# Patient Record
Sex: FEMALE | Race: WHITE | NOT HISPANIC OR LATINO | URBAN - METROPOLITAN AREA
[De-identification: names, ages, dates, MRNs, and addresses within clinical notes are randomized per-mention and may not be internally consistent; named-entity substitution may affect disease eponyms.]

---

## 2021-12-21 ENCOUNTER — EMERGENCY (EMERGENCY)
Facility: HOSPITAL | Age: 51
LOS: 1 days | Discharge: HOME | End: 2021-12-21
Attending: EMERGENCY MEDICINE
Payer: COMMERCIAL

## 2021-12-21 PROCEDURE — 99053 MED SERV 10PM-8AM 24 HR FAC: CPT

## 2021-12-21 PROCEDURE — 99285 EMERGENCY DEPT VISIT HI MDM: CPT
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
## 2021-12-21 NOTE — ED PROVIDER NOTE - ATTENDING CONTRIBUTION TO CARE
I personally saw the patient with the resident, and completed the key components of the history and physical exam. I then discussed the management plan with the resident.   gen mild pain resp clear cardiac no nmurmur abd soft msk + ttp b/l ankles neurovasc intact

## 2021-12-21 NOTE — ED PROVIDER NOTE - NSFOLLOWUPINSTRUCTIONS_ED_ALL_ED_FT
Followup with orthopedic doctor in next 1-7 days.     Fracture    A fracture is a break in one of your bones. This can occur from a variety of injuries, especially traumatic ones. Symptoms include pain, bruising, or swelling. Do not use the injured limb. If a fracture is in one of the bones below your waist, do not put weight on that limb unless instructed to do so by your healthcare provider. Crutches or a cane may have been provided. A splint or cast may have been applied by your health care provider. Make sure to keep it dry and follow up with an orthopedist as instructed.    SEEK IMMEDIATE MEDICAL CARE IF YOU HAVE ANY OF THE FOLLOWING SYMPTOMS: numbness, tingling, increasing pain, or weakness in any part of the injured limb.

## 2021-12-21 NOTE — ED PROVIDER NOTE - CARE PROVIDER_API CALL
Theodore Briseno)  Orthopaedic Surgery  [address] [place] [address]  Phone: [phone]  Fax: [phone]  Follow Up Time: 4-6 Days

## 2021-12-21 NOTE — ED PROVIDER NOTE - PHYSICAL EXAMINATION
General: Well appearing female  HEENT: Normocephalic, atraumatic. Moist mucous membranes. Oropharynx clear. No lymphadenopathy.  Eyes: No scleral icterus. EOMI. JAIR.  Neck:. Soft and supple. Full ROM without pain. No midline tenderness  Cardiac: Regular rate and regular rhythm. No murmurs, rubs, gallops. Peripheral pulses 2+ and symmetric. No LE edema.  Resp: Lungs CTAB. Speaking in full sentences. No wheezes, rales or rhonchi.  Abd: Soft, non-tender, non-distended. No guarding or rebound. No scars, masses, or lesions.  Back: Spine midline and non-tender. No CVA tenderness.    Skin:  abrasion over dorsal surface of left foot  Neuro: AO x 3. Moves all extremities symmetrically. Motor strength and sensation grossly intact.  Msk: Tenderness over dorsal portion of right foot, DP 2+ bilaterally, tenderness over dorsal portion of left foot
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
## 2021-12-21 NOTE — ED PROVIDER NOTE - NS ED ROS FT
General: Denies fever, chills  HEENT: Denies sensory changes, sore throat  Neck: Denies neck pain, neck stiffness  Resp: Denies coughing, SOB  Cardiovascular: Denies CP, palpitations, LE edema  GI: Denies nausea, vomiting, abdominal pain, diarrhea, constipation, blood in stool  : Denies dysuria, hematuria, frequency, incontinence  MSK: + bilateral ankle pain   Neuro: Denies HA, dizziness, numbness, weakness  Skin: Denies rashes.
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
## 2021-12-21 NOTE — ED PROVIDER NOTE - PROGRESS NOTE DETAILS
Yu Quinones for ED attending, Dr. Chaves: spoke to ortho PA who will come see patient. Beaulieu: patient with b/l lower extremity splints placed by orthopedic team. crutches given w/ hard sole shoe for R foot. strict return precautions discussed.
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
## 2021-12-22 VITALS
HEART RATE: 89 BPM | OXYGEN SATURATION: 98 % | RESPIRATION RATE: 18 BRPM | DIASTOLIC BLOOD PRESSURE: 74 MMHG | SYSTOLIC BLOOD PRESSURE: 127 MMHG

## 2021-12-22 VITALS
SYSTOLIC BLOOD PRESSURE: 175 MMHG | HEART RATE: 103 BPM | RESPIRATION RATE: 20 BRPM | OXYGEN SATURATION: 97 % | DIASTOLIC BLOOD PRESSURE: 135 MMHG | TEMPERATURE: 98 F

## 2021-12-22 LAB
ANION GAP SERPL CALC-SCNC: 17 MMOL/L — SIGNIFICANT CHANGE UP (ref 5–17)
APTT BLD: 33 SEC — SIGNIFICANT CHANGE UP (ref 27.5–35.5)
BASOPHILS # BLD AUTO: 0.03 K/UL — SIGNIFICANT CHANGE UP (ref 0–0.2)
BASOPHILS NFR BLD AUTO: 0.3 % — SIGNIFICANT CHANGE UP (ref 0–2)
BLD GP AB SCN SERPL QL: SIGNIFICANT CHANGE UP
BUN SERPL-MCNC: 15.8 MG/DL — SIGNIFICANT CHANGE UP (ref 8–20)
CALCIUM SERPL-MCNC: 9.8 MG/DL — SIGNIFICANT CHANGE UP (ref 8.6–10.2)
CHLORIDE SERPL-SCNC: 103 MMOL/L — SIGNIFICANT CHANGE UP (ref 98–107)
CO2 SERPL-SCNC: 22 MMOL/L — SIGNIFICANT CHANGE UP (ref 22–29)
CREAT SERPL-MCNC: 0.77 MG/DL — SIGNIFICANT CHANGE UP (ref 0.5–1.3)
EOSINOPHIL # BLD AUTO: 0.11 K/UL — SIGNIFICANT CHANGE UP (ref 0–0.5)
EOSINOPHIL NFR BLD AUTO: 1.2 % — SIGNIFICANT CHANGE UP (ref 0–6)
GLUCOSE SERPL-MCNC: 126 MG/DL — HIGH (ref 70–99)
HCG SERPL-ACNC: <4 MIU/ML — SIGNIFICANT CHANGE UP
HCT VFR BLD CALC: 44.4 % — SIGNIFICANT CHANGE UP (ref 34.5–45)
HGB BLD-MCNC: 14.3 G/DL — SIGNIFICANT CHANGE UP (ref 11.5–15.5)
IMM GRANULOCYTES NFR BLD AUTO: 0.2 % — SIGNIFICANT CHANGE UP (ref 0–1.5)
INR BLD: 1.04 RATIO — SIGNIFICANT CHANGE UP (ref 0.88–1.16)
LYMPHOCYTES # BLD AUTO: 1.8 K/UL — SIGNIFICANT CHANGE UP (ref 1–3.3)
LYMPHOCYTES # BLD AUTO: 19.2 % — SIGNIFICANT CHANGE UP (ref 13–44)
MCHC RBC-ENTMCNC: 27.2 PG — SIGNIFICANT CHANGE UP (ref 27–34)
MCHC RBC-ENTMCNC: 32.2 GM/DL — SIGNIFICANT CHANGE UP (ref 32–36)
MCV RBC AUTO: 84.6 FL — SIGNIFICANT CHANGE UP (ref 80–100)
MONOCYTES # BLD AUTO: 0.74 K/UL — SIGNIFICANT CHANGE UP (ref 0–0.9)
MONOCYTES NFR BLD AUTO: 7.9 % — SIGNIFICANT CHANGE UP (ref 2–14)
NEUTROPHILS # BLD AUTO: 6.69 K/UL — SIGNIFICANT CHANGE UP (ref 1.8–7.4)
NEUTROPHILS NFR BLD AUTO: 71.2 % — SIGNIFICANT CHANGE UP (ref 43–77)
PLATELET # BLD AUTO: 285 K/UL — SIGNIFICANT CHANGE UP (ref 150–400)
POTASSIUM SERPL-MCNC: 4 MMOL/L — SIGNIFICANT CHANGE UP (ref 3.5–5.3)
POTASSIUM SERPL-SCNC: 4 MMOL/L — SIGNIFICANT CHANGE UP (ref 3.5–5.3)
PROTHROM AB SERPL-ACNC: 12 SEC — SIGNIFICANT CHANGE UP (ref 10.6–13.6)
RAPID RVP RESULT: DETECTED
RBC # BLD: 5.25 M/UL — HIGH (ref 3.8–5.2)
RBC # FLD: 15.2 % — HIGH (ref 10.3–14.5)
RV+EV RNA SPEC QL NAA+PROBE: DETECTED
SARS-COV-2 RNA SPEC QL NAA+PROBE: SIGNIFICANT CHANGE UP
SODIUM SERPL-SCNC: 142 MMOL/L — SIGNIFICANT CHANGE UP (ref 135–145)
WBC # BLD: 9.39 K/UL — SIGNIFICANT CHANGE UP (ref 3.8–10.5)
WBC # FLD AUTO: 9.39 K/UL — SIGNIFICANT CHANGE UP (ref 3.8–10.5)

## 2021-12-22 PROCEDURE — 85025 COMPLETE CBC W/AUTO DIFF WBC: CPT

## 2021-12-22 PROCEDURE — 86901 BLOOD TYPING SEROLOGIC RH(D): CPT

## 2021-12-22 PROCEDURE — 73590 X-RAY EXAM OF LOWER LEG: CPT | Mod: 26,50

## 2021-12-22 PROCEDURE — 85730 THROMBOPLASTIN TIME PARTIAL: CPT

## 2021-12-22 PROCEDURE — 99284 EMERGENCY DEPT VISIT MOD MDM: CPT | Mod: 25

## 2021-12-22 PROCEDURE — 73700 CT LOWER EXTREMITY W/O DYE: CPT | Mod: 26,50,MG

## 2021-12-22 PROCEDURE — G1004: CPT

## 2021-12-22 PROCEDURE — 28490 TREAT BIG TOE FRACTURE: CPT | Mod: T5

## 2021-12-22 PROCEDURE — 73590 X-RAY EXAM OF LOWER LEG: CPT

## 2021-12-22 PROCEDURE — 73630 X-RAY EXAM OF FOOT: CPT

## 2021-12-22 PROCEDURE — 80048 BASIC METABOLIC PNL TOTAL CA: CPT

## 2021-12-22 PROCEDURE — 73700 CT LOWER EXTREMITY W/O DYE: CPT | Mod: MG

## 2021-12-22 PROCEDURE — 84702 CHORIONIC GONADOTROPIN TEST: CPT

## 2021-12-22 PROCEDURE — 73630 X-RAY EXAM OF FOOT: CPT | Mod: 26,50

## 2021-12-22 PROCEDURE — 86900 BLOOD TYPING SEROLOGIC ABO: CPT

## 2021-12-22 PROCEDURE — 99283 EMERGENCY DEPT VISIT LOW MDM: CPT | Mod: 57

## 2021-12-22 PROCEDURE — 28450 TX TARSAL B1 FX W/O MNPJ EA: CPT | Mod: LT

## 2021-12-22 PROCEDURE — 36415 COLL VENOUS BLD VENIPUNCTURE: CPT

## 2021-12-22 PROCEDURE — 73610 X-RAY EXAM OF ANKLE: CPT

## 2021-12-22 PROCEDURE — 86850 RBC ANTIBODY SCREEN: CPT

## 2021-12-22 PROCEDURE — 73610 X-RAY EXAM OF ANKLE: CPT | Mod: 26,50

## 2021-12-22 PROCEDURE — 0225U NFCT DS DNA&RNA 21 SARSCOV2: CPT

## 2021-12-22 PROCEDURE — 85610 PROTHROMBIN TIME: CPT

## 2021-12-22 RX ORDER — HYDROMORPHONE HYDROCHLORIDE 2 MG/ML
1 INJECTION INTRAMUSCULAR; INTRAVENOUS; SUBCUTANEOUS ONCE
Refills: 0 | Status: DISCONTINUED | OUTPATIENT
Start: 2021-12-22 | End: 2021-12-22

## 2021-12-22 RX ADMIN — HYDROMORPHONE HYDROCHLORIDE 1 MILLIGRAM(S): 2 INJECTION INTRAMUSCULAR; INTRAVENOUS; SUBCUTANEOUS at 05:19

## 2021-12-22 RX ADMIN — HYDROMORPHONE HYDROCHLORIDE 1 MILLIGRAM(S): 2 INJECTION INTRAMUSCULAR; INTRAVENOUS; SUBCUTANEOUS at 01:20

## 2021-12-22 RX ADMIN — HYDROMORPHONE HYDROCHLORIDE 1 MILLIGRAM(S): 2 INJECTION INTRAMUSCULAR; INTRAVENOUS; SUBCUTANEOUS at 03:45

## 2021-12-22 RX ADMIN — HYDROMORPHONE HYDROCHLORIDE 1 MILLIGRAM(S): 2 INJECTION INTRAMUSCULAR; INTRAVENOUS; SUBCUTANEOUS at 00:05

## 2021-12-22 RX ADMIN — HYDROMORPHONE HYDROCHLORIDE 1 MILLIGRAM(S): 2 INJECTION INTRAMUSCULAR; INTRAVENOUS; SUBCUTANEOUS at 00:16

## 2021-12-22 NOTE — CONSULT NOTE ADULT - SUBJECTIVE AND OBJECTIVE BOX
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 were obtained   without intravenous contrast. Coronal and sagittal reformatted images   were also obtained. 3-D images were obtained from a separate workstation.    CONTRAST/COMPLICATIONS:  IV Contrast: NONE  Complications: None reported at time of study completion    COMPARISON: XR: 12/22/2021. CT: None. MR: None.    FINDINGS: Evaluation of soft tissue is limited without intravenous   contrast.    RIGHT  Bones: Nondisplaced, intra-articular fracture of the lateralbase of the   first distal phalanx. No dislocation.    Mild deformity of the posterior talus with adjacent corticated ossific   densities, which may be related to old post traumatic changes and/or os   trigonum. Degenerative changes at the right ankle and foot. Nonspecific   scattered small sclerotic foci.    Soft tissue: Nonspecific stranding/edema in the right great toe. Diffuse   muscle bulk loss with fatty replacement. Trace ankle joint effusion.   Achilles enthesopathy.    LEFT  Bones: Minimally displaced fracture of the lateral aspect of the medial   cuneiform.    Slight contour deformity of the left medial malleolus with adjacent   ossific densities, which may represent age-indeterminate avulsion   fracture.    Mild deformity of the posterior talus with adjacent corticated ossific   densities, which may be related to old post traumatic changes and/or os   trigonum. Degenerative changes at the right ankle and foot. Nonspecific   scattered small sclerotic foci.    Soft tissue: Edema/hematoma and foci of air in the ankle/foot soft   tissue. Diffuse muscle bulk loss with fatty replacement. Trace ankle   joint effusion. Achilles enthesopathy.    IMPRESSION:    RIGHT: Nondisplaced intra-articular fracture of the lateral base of the   right first distal phalanx.    LEFT: Minimally displaced fracture of the lateral aspect of the left   medial cuneiform. Suggest age-indeterminate (avulsion) fracture off of   the left medial malleolus.    --- End of Report ---            RACHEL SENA; Attending Radiologist  This document has been electronically signed. Dec 22 2021  3:25AM    < end of copied text >     Procedure: SPLINTING   PROCEDURE NOTE: Splinting    Performed by: Enzo Torres PA-C     Indication: right 1st toe fx, left cuneiform fx    The RLE/LLE was appropriately positioned. A plaster splint was applied. Distally, the extremity was neurovascular intact following the procedure. The patient tolerated the procedure well.     A/P:  Pt is a  51y Female with right nondisplaced distal phalanx fx & left medial cuneiform fx. Age indeterminate fracture of the left medial malleolus.    PLAN:   * NWB of the LLE in splint  * Patient may weight bear on the right foot in fx shoe due to injury of the left foot  * Elevate b/l LEs as tolerated  * Recommend follow up outpt with podiatry or Ortho Trauma  * Pt eval as needed if pt unable to ambulate  * Ortho stable

## 2021-12-22 NOTE — CONSULT NOTE ADULT - ATTENDING COMMENTS
Orthopaedic Trauma Surgeon Addendum:    I have personally performed a face-to-face diagnostic evaluation on this patient.  I have reviewed the physician assistant note and agree with the history, exam, and plan of care, except as noted.    Theodore Briseno MD  Orthopaedic Trauma Surgeon  [facility] [place]

## 2022-02-04 NOTE — ED PROVIDER NOTE - PATIENT PORTAL LINK FT
Chief Complaints and History of Present Illnesses   Patient presents with     Glaucoma Follow-Up       Chief Complaint(s) and History of Present Illness(es)     Glaucoma Follow-Up     Laterality: both eyes              Comments     Patient here for IOP check and dilation. Last visit was 9/20/21 with Dr. Gupta. Pt notes eyes feel like they have glue in them. Continues to be light sensitive, states this is the same as it was at last visit.   Using Latanoprost at bedtime each eye (last dose: 9:00pm 2/3/22). Also using Systane for dryness prn each eye.     Here with daughter who is her POA.                Betsey Chavez, COA    
You can access the FollowMyHealth Patient Portal offered by [facility] by registering at the following website: [email or web address]. By joining [employer]’s FollowMyHealth portal, you will also be able to view your health information using other applications (apps) compatible with our system.

## 2023-03-15 ENCOUNTER — OFFICE (OUTPATIENT)
Dept: URBAN - METROPOLITAN AREA CLINIC 113 | Facility: CLINIC | Age: 53
Setting detail: OPHTHALMOLOGY
End: 2023-03-15
Payer: MEDICAID

## 2023-03-15 DIAGNOSIS — H01.001: ICD-10-CM

## 2023-03-15 DIAGNOSIS — H40.013: ICD-10-CM

## 2023-03-15 DIAGNOSIS — H52.7: ICD-10-CM

## 2023-03-15 DIAGNOSIS — H01.004: ICD-10-CM

## 2023-03-15 PROCEDURE — 76514 ECHO EXAM OF EYE THICKNESS: CPT | Performed by: STUDENT IN AN ORGANIZED HEALTH CARE EDUCATION/TRAINING PROGRAM

## 2023-03-15 PROCEDURE — 92020 GONIOSCOPY: CPT | Performed by: STUDENT IN AN ORGANIZED HEALTH CARE EDUCATION/TRAINING PROGRAM

## 2023-03-15 PROCEDURE — 92004 COMPRE OPH EXAM NEW PT 1/>: CPT | Performed by: STUDENT IN AN ORGANIZED HEALTH CARE EDUCATION/TRAINING PROGRAM

## 2023-03-15 PROCEDURE — 92015 DETERMINE REFRACTIVE STATE: CPT | Performed by: STUDENT IN AN ORGANIZED HEALTH CARE EDUCATION/TRAINING PROGRAM

## 2023-03-15 PROCEDURE — 92250 FUNDUS PHOTOGRAPHY W/I&R: CPT | Performed by: STUDENT IN AN ORGANIZED HEALTH CARE EDUCATION/TRAINING PROGRAM

## 2023-03-15 ASSESSMENT — REFRACTION_MANIFEST
OD_CYLINDER: -1.25
OS_CYLINDER: -0.75
OS_AXIS: 087
OS_ADD: +1.50
OS_SPHERE: +0.25
OD_AXIS: 097
OS_VA2: 20/20
OD_SPHERE: +0.25
OS_VA1: 20/20
OD_ADD: +1.50
OD_VA2: 20/20
OD_VA1: 20/20
OU_VA: 20/20

## 2023-03-15 ASSESSMENT — SPHEQUIV_DERIVED
OD_SPHEQUIV: -0.375
OS_SPHEQUIV: -0.5
OS_SPHEQUIV: -0.125
OD_SPHEQUIV: -0.375

## 2023-03-15 ASSESSMENT — PACHYMETRY
OD_CT_UM: 551
OD_CT_CORRECTION: -1
OS_CT_CORRECTION: -1
OS_CT_UM: 551

## 2023-03-15 ASSESSMENT — CONFRONTATIONAL VISUAL FIELD TEST (CVF)
OD_FINDINGS: FULL
OS_FINDINGS: FULL

## 2023-03-15 ASSESSMENT — AXIALLENGTH_DERIVED
OS_AL: 23.1885
OD_AL: 23.4179
OD_AL: 23.4179
OS_AL: 23.3305

## 2023-03-15 ASSESSMENT — VISUAL ACUITY
OD_BCVA: 20/20
OS_BCVA: 20/25-2

## 2023-03-15 ASSESSMENT — KERATOMETRY
OS_AXISANGLE_DEGREES: 090
OD_K2POWER_DIOPTERS: 44.50
OD_K1POWER_DIOPTERS: 44.25
OS_K2POWER_DIOPTERS: 44.75
OD_AXISANGLE_DEGREES: 018
OS_K1POWER_DIOPTERS: 44.75

## 2023-03-15 ASSESSMENT — REFRACTION_AUTOREFRACTION
OD_CYLINDER: -1.25
OD_SPHERE: +0.25
OS_CYLINDER: -0.50
OD_AXIS: 097
OS_AXIS: 087
OS_SPHERE: -0.25

## 2023-03-15 ASSESSMENT — LID EXAM ASSESSMENTS
OS_BLEPHARITIS: LUL T
OD_BLEPHARITIS: RUL T